# Patient Record
Sex: FEMALE
[De-identification: names, ages, dates, MRNs, and addresses within clinical notes are randomized per-mention and may not be internally consistent; named-entity substitution may affect disease eponyms.]

---

## 2017-09-25 PROBLEM — Z00.00 ENCOUNTER FOR PREVENTIVE HEALTH EXAMINATION: Status: ACTIVE | Noted: 2017-09-25

## 2017-10-02 ENCOUNTER — APPOINTMENT (OUTPATIENT)
Dept: OBGYN | Facility: CLINIC | Age: 22
End: 2017-10-02
Payer: COMMERCIAL

## 2017-10-02 ENCOUNTER — LABORATORY RESULT (OUTPATIENT)
Age: 22
End: 2017-10-02

## 2017-10-02 VITALS
SYSTOLIC BLOOD PRESSURE: 100 MMHG | HEIGHT: 63 IN | WEIGHT: 128 LBS | BODY MASS INDEX: 22.68 KG/M2 | DIASTOLIC BLOOD PRESSURE: 60 MMHG

## 2017-10-02 DIAGNOSIS — Z82.49 FAMILY HISTORY OF ISCHEMIC HEART DISEASE AND OTHER DISEASES OF THE CIRCULATORY SYSTEM: ICD-10-CM

## 2017-10-02 DIAGNOSIS — Z01.419 ENCOUNTER FOR GYNECOLOGICAL EXAMINATION (GENERAL) (ROUTINE) W/OUT ABNORMAL FINDINGS: ICD-10-CM

## 2017-10-02 DIAGNOSIS — Z20.2 CONTACT WITH AND (SUSPECTED) EXPOSURE TO INFECTIONS WITH A PREDOMINANTLY SEXUAL MODE OF TRANSMISSION: ICD-10-CM

## 2017-10-02 DIAGNOSIS — Z78.9 OTHER SPECIFIED HEALTH STATUS: ICD-10-CM

## 2017-10-02 LAB — HCG UR QL: NEGATIVE

## 2017-10-02 PROCEDURE — 81025 URINE PREGNANCY TEST: CPT

## 2017-10-02 PROCEDURE — 99385 PREV VISIT NEW AGE 18-39: CPT

## 2017-10-02 PROCEDURE — 36415 COLL VENOUS BLD VENIPUNCTURE: CPT

## 2017-10-03 PROBLEM — Z78.9 SOCIAL ALCOHOL USE: Status: ACTIVE | Noted: 2017-10-03

## 2017-10-03 PROBLEM — Z82.49 FAMILY HISTORY OF ESSENTIAL HYPERTENSION: Status: ACTIVE | Noted: 2017-10-03

## 2017-10-03 PROBLEM — Z01.419 WELL WOMAN EXAM WITH ROUTINE GYNECOLOGICAL EXAM: Status: ACTIVE | Noted: 2017-10-02

## 2017-10-03 PROBLEM — Z78.9 PATIENT DENIES MEDICAL PROBLEMS: Status: RESOLVED | Noted: 2017-10-03 | Resolved: 2017-10-03

## 2017-10-03 LAB
C TRACH RRNA SPEC QL NAA+PROBE: NORMAL
HBV SURFACE AB SER QL: REACTIVE
HBV SURFACE AG SER QL: NONREACTIVE
HCV AB SER QL: NONREACTIVE
HCV S/CO RATIO: 0.12 S/CO
HIV1+2 AB SPEC QL IA.RAPID: NONREACTIVE
HSV 1+2 IGG SER IA-IMP: NORMAL
HSV 1+2 IGG SER IA-IMP: POSITIVE
HSV1 IGG SER QL: >62.2 INDEX
HSV2 IGG SER QL: 0.08 INDEX
N GONORRHOEA RRNA SPEC QL NAA+PROBE: NORMAL
SOURCE TP AMPLIFICATION: NORMAL

## 2017-10-05 ENCOUNTER — RESULT REVIEW (OUTPATIENT)
Age: 22
End: 2017-10-05

## 2017-10-05 LAB
HBV E AB SER QL: NEGATIVE
HERPES SIMPLEX 1 AND 2 ABS IGM: <0.91 RATIO

## 2017-10-23 ENCOUNTER — APPOINTMENT (OUTPATIENT)
Dept: OBGYN | Facility: CLINIC | Age: 22
End: 2017-10-23
Payer: COMMERCIAL

## 2017-10-23 ENCOUNTER — LABORATORY RESULT (OUTPATIENT)
Age: 22
End: 2017-10-23

## 2017-10-23 LAB
HCG UR QL: NEGATIVE
QUALITY CONTROL: YES

## 2017-10-23 PROCEDURE — 76830 TRANSVAGINAL US NON-OB: CPT

## 2017-10-23 PROCEDURE — 99214 OFFICE O/P EST MOD 30 MIN: CPT | Mod: 25

## 2017-10-23 PROCEDURE — 81025 URINE PREGNANCY TEST: CPT

## 2017-10-24 LAB
C TRACH RRNA SPEC QL NAA+PROBE: NOT DETECTED
N GONORRHOEA RRNA SPEC QL NAA+PROBE: NOT DETECTED
SOURCE TP AMPLIFICATION: NORMAL

## 2017-10-26 ENCOUNTER — RESULT REVIEW (OUTPATIENT)
Age: 22
End: 2017-10-26

## 2017-11-27 ENCOUNTER — APPOINTMENT (OUTPATIENT)
Dept: OBGYN | Facility: CLINIC | Age: 22
End: 2017-11-27
Payer: COMMERCIAL

## 2017-11-27 VITALS
HEIGHT: 63 IN | WEIGHT: 128 LBS | DIASTOLIC BLOOD PRESSURE: 64 MMHG | BODY MASS INDEX: 22.68 KG/M2 | RESPIRATION RATE: 16 BRPM | SYSTOLIC BLOOD PRESSURE: 102 MMHG

## 2017-11-27 DIAGNOSIS — Z30.430 ENCOUNTER FOR INSERTION OF INTRAUTERINE CONTRACEPTIVE DEVICE: ICD-10-CM

## 2017-11-27 DIAGNOSIS — Z97.5 PRESENCE OF (INTRAUTERINE) CONTRACEPTIVE DEVICE: ICD-10-CM

## 2017-11-27 PROCEDURE — 81025 URINE PREGNANCY TEST: CPT

## 2017-11-27 PROCEDURE — 99213 OFFICE O/P EST LOW 20 MIN: CPT | Mod: 25

## 2017-11-27 PROCEDURE — 76830 TRANSVAGINAL US NON-OB: CPT

## 2017-12-19 ENCOUNTER — APPOINTMENT (OUTPATIENT)
Dept: OBGYN | Facility: CLINIC | Age: 22
End: 2017-12-19
Payer: COMMERCIAL

## 2017-12-19 VITALS
BODY MASS INDEX: 23.04 KG/M2 | RESPIRATION RATE: 16 BRPM | TEMPERATURE: 98.3 F | WEIGHT: 130 LBS | HEIGHT: 63 IN | DIASTOLIC BLOOD PRESSURE: 78 MMHG | SYSTOLIC BLOOD PRESSURE: 120 MMHG

## 2017-12-19 DIAGNOSIS — Z30.49 ENCOUNTER FOR SURVEILLANCE OF OTHER CONTRACEPTIVES: ICD-10-CM

## 2017-12-19 LAB
BILIRUB UR QL STRIP: NORMAL
CLARITY UR: CLEAR
COLLECTION METHOD: NORMAL
GLUCOSE UR-MCNC: NORMAL
HCG UR QL: 0.2 EU/DL
HCG UR QL: NEGATIVE
HGB UR QL STRIP.AUTO: NORMAL
KETONES UR-MCNC: NORMAL
LEUKOCYTE ESTERASE UR QL STRIP: NORMAL
NITRITE UR QL STRIP: NORMAL
PH UR STRIP: 7.5
PROT UR STRIP-MCNC: NORMAL
QUALITY CONTROL: YES
SP GR UR STRIP: 1.01

## 2017-12-19 PROCEDURE — 99214 OFFICE O/P EST MOD 30 MIN: CPT | Mod: 25

## 2017-12-19 PROCEDURE — 76830 TRANSVAGINAL US NON-OB: CPT

## 2018-07-30 ENCOUNTER — APPOINTMENT (OUTPATIENT)
Dept: OBGYN | Facility: CLINIC | Age: 23
End: 2018-07-30
Payer: COMMERCIAL

## 2018-07-30 PROCEDURE — 99213 OFFICE O/P EST LOW 20 MIN: CPT

## 2018-08-01 ENCOUNTER — RX RENEWAL (OUTPATIENT)
Age: 23
End: 2018-08-01

## 2018-08-01 LAB
CANDIDA VAG CYTO: NOT DETECTED
G VAGINALIS+PREV SP MTYP VAG QL MICRO: DETECTED
T VAGINALIS VAG QL WET PREP: NOT DETECTED

## 2018-08-17 ENCOUNTER — RX RENEWAL (OUTPATIENT)
Age: 23
End: 2018-08-17

## 2018-10-26 ENCOUNTER — APPOINTMENT (OUTPATIENT)
Dept: OBGYN | Facility: CLINIC | Age: 23
End: 2018-10-26
Payer: COMMERCIAL

## 2018-10-26 VITALS
DIASTOLIC BLOOD PRESSURE: 68 MMHG | BODY MASS INDEX: 23.04 KG/M2 | HEIGHT: 63 IN | SYSTOLIC BLOOD PRESSURE: 106 MMHG | RESPIRATION RATE: 16 BRPM | TEMPERATURE: 98.4 F | WEIGHT: 130 LBS

## 2018-10-26 DIAGNOSIS — N64.9 DISORDER OF BREAST, UNSPECIFIED: ICD-10-CM

## 2018-10-26 DIAGNOSIS — B96.89 ACUTE VAGINITIS: ICD-10-CM

## 2018-10-26 DIAGNOSIS — N76.0 ACUTE VAGINITIS: ICD-10-CM

## 2018-10-26 DIAGNOSIS — Z87.42 PERSONAL HISTORY OF OTHER DISEASES OF THE FEMALE GENITAL TRACT: ICD-10-CM

## 2018-10-26 PROCEDURE — 76830 TRANSVAGINAL US NON-OB: CPT

## 2018-10-26 PROCEDURE — 99395 PREV VISIT EST AGE 18-39: CPT

## 2018-10-31 LAB — CYTOLOGY CVX/VAG DOC THIN PREP: NORMAL

## 2019-05-10 ENCOUNTER — APPOINTMENT (OUTPATIENT)
Dept: OBGYN | Facility: CLINIC | Age: 24
End: 2019-05-10
Payer: COMMERCIAL

## 2019-05-10 VITALS
TEMPERATURE: 98.5 F | WEIGHT: 124.12 LBS | BODY MASS INDEX: 21.99 KG/M2 | HEIGHT: 63 IN | SYSTOLIC BLOOD PRESSURE: 100 MMHG | DIASTOLIC BLOOD PRESSURE: 70 MMHG

## 2019-05-10 DIAGNOSIS — N83.209 UNSPECIFIED OVARIAN CYST, UNSPECIFIED SIDE: ICD-10-CM

## 2019-05-10 PROCEDURE — 99214 OFFICE O/P EST MOD 30 MIN: CPT | Mod: 25

## 2019-05-10 PROCEDURE — 76830 TRANSVAGINAL US NON-OB: CPT

## 2019-05-19 LAB
CANDIDA VAG CYTO: NOT DETECTED
G VAGINALIS+PREV SP MTYP VAG QL MICRO: NOT DETECTED
T VAGINALIS VAG QL WET PREP: NOT DETECTED

## 2019-05-19 NOTE — PHYSICAL EXAM
[Soft] : soft [Discharge] : a  ~M vaginal discharge was present [Moderate] : moderate [Michelle] : yellow [Scant] : there was scant vaginal bleeding [Thin] : thin [IUD String] : had an IUD string protruding out [Distended] : not distended [Tender] : non tender [Motion Tenderness] : there was no cervical motion tenderness

## 2019-05-20 ENCOUNTER — RESULT REVIEW (OUTPATIENT)
Age: 24
End: 2019-05-20

## 2019-09-30 ENCOUNTER — APPOINTMENT (OUTPATIENT)
Dept: OBGYN | Facility: CLINIC | Age: 24
End: 2019-09-30
Payer: COMMERCIAL

## 2019-09-30 VITALS
BODY MASS INDEX: 21.88 KG/M2 | HEIGHT: 63 IN | TEMPERATURE: 98.6 F | SYSTOLIC BLOOD PRESSURE: 100 MMHG | DIASTOLIC BLOOD PRESSURE: 70 MMHG | WEIGHT: 123.46 LBS

## 2019-09-30 DIAGNOSIS — Z97.5 PROCEDURE AND TREATMENT NOT CARRIED OUT FOR OTHER REASONS: ICD-10-CM

## 2019-09-30 DIAGNOSIS — Z53.8 PROCEDURE AND TREATMENT NOT CARRIED OUT FOR OTHER REASONS: ICD-10-CM

## 2019-09-30 DIAGNOSIS — Z30.9 ENCOUNTER FOR CONTRACEPTIVE MANAGEMENT, UNSPECIFIED: ICD-10-CM

## 2019-09-30 PROCEDURE — 99214 OFFICE O/P EST MOD 30 MIN: CPT

## 2019-09-30 NOTE — PHYSICAL EXAM
[Normal] : vagina [Labia Majora] : labia major [IUD String] : did not have an IUD string protruding out

## 2019-09-30 NOTE — HISTORY OF PRESENT ILLNESS
eMERGENCY dEPARTMENT eNCOUnter      CHIEF COMPLAINT    Chief Complaint   Patient presents with   • Back Pain   • Abdominal Pain       HPI    Bernadine Sahu is a 52 year old female who presents to the emergency department here for epigastric pain and mid abdominal pain upper, started 4:30 this afternoon, mild to moderate sharp burning pain, also middle of her back pain. History of scoliosis. Has some loose stools. No nausea vomiting fever or chills. No shortness breath or pleurisy. No neurologic focal deficits.    ALLERGIES    ALLERGIES:   Allergen Reactions   • Ciprofloxacin SHORTNESS OF BREATH   • Macrobid [Nitrofurantoin] SHORTNESS OF BREATH     Possible pulmonary toxicity.   • Metronidazole Nausea & Vomiting     Nausea and emesis   • Seasonal ANAPHYLAXIS   • Codeine RASH   • Hydrocodone HIVES and RASH       CURRENT MEDICATIONS    Current Facility-Administered Medications   Medication Dose Route Frequency Provider Last Rate Last Dose   • aluminum-magnesium hydroxide-simethicone (MAALOX) 200-200-20 MG/5ML suspension 30 mL  30 mL Oral PRN Karl T Dinero, DO   30 mL at 02/19/18 2315    And   • lidocaine viscous (XYLOCAINE) 2 % oral solution 15 mL  15 mL Oral PRN Karl T Dinero, DO   15 mL at 02/19/18 2315     Current Outpatient Prescriptions   Medication Sig Dispense Refill   • meloxicam (MOBIC) 15 MG tablet Take 1 tablet by mouth daily. 30 tablet 5   • hydroCORTisone (CORTIZONE) 2.5 % cream Apply twice daily for 2 weeks (need appointment if not improving) 28 g 0   • tiotropium (SPIRIVA RESPIMAT) 2.5 MCG/ACT inhaler Inhale 2 puffs into the lungs daily. 4 g 5   • albuterol 108 (90 Base) MCG/ACT inhaler Inhale 2 puffs into the lungs every 4 hours as needed for Shortness of Breath or Wheezing. 8.5 g 0   • diclofenac (VOLTAREN) 1 % gel Apply 2-4 grams to the knees up to 4 times daily as needed 300 g 5   • acetaminophen (TYLENOL) 650 MG CR tablet Take 1 tablet by mouth every 6 hours as needed for Pain. 120 tablet 3   •  loratadine (CLARITIN) 10 MG tablet Take 1 tablet by mouth daily. (Patient taking differently: Take 10 mg by mouth daily. During allergy seasons.) 30 tablet 2   • ibuprofen (MOTRIN) 600 MG tablet Take 1 tablet by mouth every 8 hours as needed for Pain. 90 tablet 3   • tiZANidine (ZANAFLEX) 2 MG tablet 1-2 tabs po TID PRN for muscle spasms 90 tablet 1   • calcium (OYST-DOMINIC) 500 MG tablet Take 1 tablet by mouth daily. 30 tablet 0   • cholecalciferol (VITAMIN D3) 1000 UNITS tablet Take 1,000 Units by mouth daily.         PAST MEDICAL HISTORY    Past Medical History:   Diagnosis Date   • Adenomatous colon polyp 01/2017   • Allergy    • Back pain    • Centrilobular emphysema (CMS/HCC) 8/11/2017   • Chronic pain     back   • DJD (degenerative joint disease), thoracolumbar     xray 1/2009   • Herpes simplex    • RAD (reactive airway disease)    • Scoliosis    • Sinusitis, chronic    • Tobacco use disorder        SURGICAL HISTORY    Past Surgical History:   Procedure Laterality Date   • Back surgery  1976    scoliosis, fusion thoracolumbar, rods wires   • Colonoscopy diagnostic  01/19/2017    Affi 3yr recall, 4 polyps tubular adenomas   • Hysterectomy  11/30/2007    uterine fibroid   • Spine surgery         SOCIAL HISTORY    Social History     Social History   • Marital status:      Spouse name: N/A   • Number of children: N/A   • Years of education: N/A     Occupational History   • unemployed      Social History Main Topics   • Smoking status: Former Smoker     Packs/day: 0.30     Years: 28.00     Types: Cigarettes     Quit date: 1/1/2017   • Smokeless tobacco: Never Used      Comment: 3 to 4 cigarettes per day, quit since Jan   • Alcohol use 0.0 oz/week      Comment: rarely   • Drug use: No   • Sexual activity: Not Currently     Birth control/ protection: Surgical     Other Topics Concern   • Caffeine Concern No   • Sleep Concern No     difficult to get comfortable   • Special Diet No   • Exercise No   • Seat Belt  Yes     Social History Narrative    Lives by self.  Feels safe in home       FAMILY HISTORY    Family History   Problem Relation Age of Onset   • Hypertension Mother    • High blood pressure Mother    • Early death Brother 46     killed   • Aneurysm Brother 47   • Hypertension Brother    • Thyroid Other      cousin   • Systemic Lupus Erythematosus Other      cousin   • Arthritis Daughter    • High blood pressure Daughter    • Arthritis Son        REVIEW OF SYSTEMS      Constitutional:  Denies fever or chills.   Eyes:  Denies change in visual acuity, no diplopia  HENT:  Denies nasal congestion or sore throat.   Respiratory:  Denies cough or shortness of breath.   Cardiovascular:  Denies chest pain or palpitations.   GI:   abdominal pain. No nausea or vomiting. Loose stools.  :  Denies dysuria. No hematuria.  Musculoskeletal:  Denies back pain or joint pain.   Skin:  Denies rash. No petechia.  Neurologic:  Denies headache. No weakness. No paresthesias.   Endocrine:  Denies polyuria or polydipsia.   Lymphatic:  Denies swollen glands or edema  Psychiatric:  Denies depression or anxiety.    PHYSICAL EXAM    Vitals:    02/19/18 2038 02/20/18 0019   BP: 132/73 121/79   Pulse: 75 72   Resp: 16 16   Temp: 98.3 °F (36.8 °C)    TempSrc: Oral    SpO2: 98% 99%   Weight: 81.6 kg    Height: 5' 9\" (1.753 m)        Pulse Ox / Interpretation:  98%    Constitutional:  Well developed, well nourished. No acute distress, non-toxic appearance.   Eyes:  PERRL, EOMI.  Conjunctivae normal.   HENT:  Atraumatic/normocephalic. External ears normal. Nose normal. Oropharynx moist. No drooling, no stridor  Neck:  Supple. Normal range of motion. No tenderness. No meningeal signs  Respiratory:  No respiratory distress, normal breath sounds. No rales. No wheezing. No retractions  Cardiovascular:  Normal rate, normal rhythm. No murmurs, gallops, or rubs.  GI:  Soft, nondistended, nonrigid, no guarding. Mild epigastric region tenderness.  Normal  [Sexually Active] : is sexually active bowel sounds. No hepatomegaly or splenomegaly.  No rebound or guarding. Negative Lopez's sign. Negative McBurney's point.   :  No costovertebral angle tenderness.   Musculoskeletal:  No tenderness. No edema in all 4 extremities. No deformities. Back - no muscular or point vertebral tenderness. No homans.  Skin:  Warm and dry. Well hydrated. No rashes.  No petechiae or purpura.   Lymphatic:  No anterior or posterior cervical lymphadenopathy noted. No submandibular lymphadenopathy.  Neurologic:  Alert & oriented x 3.  Normal motor function. Normal sensory function. No focal deficits noted. GCS 15.  Psychiatric:  Speech and behavior appropriate.   Pulses:  DP/PT 2+, Radial 2+      EKG    Preliminary EKG interpretation: per my review:  Rate: 55  Rhythm: normal sinus rhythm normal axis no STEMI no acute findings  Abnormality: no  Final interpretation pending formal review by cardiology      RADIOLOGY        I have reviewed radiology images and impressions    LABS    Results for orders placed or performed during the hospital encounter of 02/19/18   Urinalysis with Micro & Culture if Indicated   Result Value    COLOR YELLOW    APPEARANCE CLEAR    GLUCOSE(URINE) NEGATIVE    BILIRUBIN NEGATIVE    KETONES NEGATIVE    SPECIFIC GRAVITY 1.015    BLOOD NEGATIVE    pH 7.5 (H)    PROTEIN(URINE) NEGATIVE    UROBILINOGEN 0.2    NITRITE NEGATIVE    LEUKOCYTE ESTERASE NEGATIVE    Squamous EPI'S 11 to 25    RBC NONE SEEN    WBC 1 to 5    BACTERIA NONE SEEN    Hyaline Casts NONE SEEN    SPECIMEN TYPE URINE, CLEAN CATCH/MIDSTREAM   Comprehensive Metabolic Panel   Result Value    Sodium 142    Potassium 3.9    Chloride 107    Carbon Dioxide 27    Anion Gap 12    Glucose 103 (H)    BUN 18    Creatinine 0.91    GFR Estimate,  83     Comment: eGFR 60 - 89 mL/min/1.73m2 = Mild decrease in kidney function.    GFR Estimate, Non  72     Comment: eGFR 60 - 89 mL/min/1.73m2 = Mild decrease in kidney function.     BUN/Creatinine Ratio 20    CALCIUM 8.8    TOTAL BILIRUBIN 0.3    AST/SGOT 19    ALT/SGPT 24    ALK PHOSPHATASE 91    TOTAL PROTEIN 7.0    Albumin 3.8    GLOBULIN 3.2    A/G Ratio, Serum 1.2   CBC & Auto Differential   Result Value    WBC 7.0    RBC 4.11    HGB 11.7 (L)    HCT 35.3 (L)    MCV 85.9    MCH 28.5    MCHC 33.1    RDW-CV 12.7        DIFF TYPE AUTOMATED DIFFERENTIAL    Neutrophil 76    LYMPH 18    MONO 4    EOSIN 2    BASO 0    Absolute Neutrophil 5.3    Absolute Lymph 1.3    Absolute Mono 0.3    Absolute Eos 0.2    Absolute Baso 0.0   Lipase Level   Result Value    Lipase 106   D Dimer Quantitative   Result Value    D Dimer Quantitative 1.00 (H)     Comment: Values <0.50 mg/L (FEU) may be useful to help exclude DVT or PE in patients at low clinical risk for these disorders.   Troponin I - Point of Care   Result Value    Troponin I POC <0.10       I have reviewed lab results    RECHECKS   Medications   aluminum-magnesium hydroxide-simethicone (MAALOX) 200-200-20 MG/5ML suspension 30 mL (30 mLs Oral Given 2/19/18 2315)     And   lidocaine viscous (XYLOCAINE) 2 % oral solution 15 mL (15 mLs Oral Given 2/19/18 2315)   famotidine (PEPCID) injection 20 mg (20 mg Intravenous Given 2/19/18 2315)   iopamidol (ISOVUE-370) 76 % injection 75 mL (75 mLs Intravenous Given 2/19/18 2330)             ED COURSE & MEDICAL DECISION MAKING    52 year old female presents to the emergency department with complaints of upper abdominal pain region, nonradiating, some back pain associated. Awaiting for labs and ultrasound of the upper quadrant. I will trial Pepcid and GI cocktail. EKG troponin negative. Endorsed to Dr. Lo, for reevaluation review of pending labs and diagnostics and final disposition.    Patient signed out from Dr Dinero. Labs, US and x-ray pending. D dimer of 1.0 noted. CT PE protocol ordered in response.     Labs, ultrasound, x-ray and CT all without evidence of acute process. Patient was made aware of the  [Monogamous] : is monogamous [Male ___] : [unfilled] male nodule on CT as well as the emphysematous changes. Patient states she will follow-up with her primary care provider tomorrow. His custom recommendations for return including worsening or change in symptoms with the patient and her significant other voiced her understanding.    Discussed with patient/and family results, treatments, and plan.  All questions were answered.     IMPRESSION:      ICD-10-CM    1. Epigastric pain R10.13        Follow Up:  Leana Ovalles NP  [address]   [facility] [address]  [phone]          [facility]  [address] [facility] [address]  [phone]    If symptoms worsen       New Prescriptions    No medications on file                    Karl Dinero DO  02/19/18 2301       Lex Lo MD  02/19/18 7493       Lex Lo MD  02/20/18 0039

## 2019-11-01 ENCOUNTER — APPOINTMENT (OUTPATIENT)
Dept: OBGYN | Facility: CLINIC | Age: 24
End: 2019-11-01

## 2020-01-21 ENCOUNTER — APPOINTMENT (OUTPATIENT)
Dept: OBGYN | Facility: CLINIC | Age: 25
End: 2020-01-21
Payer: COMMERCIAL

## 2020-01-21 VITALS
RESPIRATION RATE: 18 BRPM | OXYGEN SATURATION: 98 % | HEIGHT: 63 IN | BODY MASS INDEX: 22.68 KG/M2 | SYSTOLIC BLOOD PRESSURE: 122 MMHG | WEIGHT: 128 LBS | HEART RATE: 71 BPM | DIASTOLIC BLOOD PRESSURE: 70 MMHG

## 2020-01-21 DIAGNOSIS — Z01.419 ENCOUNTER FOR GYNECOLOGICAL EXAMINATION (GENERAL) (ROUTINE) W/OUT ABNORMAL FINDINGS: ICD-10-CM

## 2020-01-21 PROCEDURE — 99395 PREV VISIT EST AGE 18-39: CPT

## 2020-01-22 NOTE — PHYSICAL EXAM
[Alert] : alert [Awake] : awake [Mass] : no breast mass [Acute Distress] : no acute distress [Axillary LAD] : no axillary lymphadenopathy [Nipple Discharge] : no nipple discharge [Soft] : soft [Tender] : non tender [Oriented x3] : oriented to person, place, and time [Normal] : uterus [No Bleeding] : there was no active vaginal bleeding [Pap Obtained] : a Pap smear was not performed [IUD String] : had an IUD string protruding out [Uterine Adnexae] : were not tender and not enlarged

## 2020-01-22 NOTE — PROCEDURE
[Locate IUD] : locate IUD [Present] : uterus present [Transvaginal Ultrasound] : transvaginal ultrasound [Anteverted] : anteverted [FreeTextEntry4] : L ovary w a 2.7cm hemorrhagic cyst; R ovary normal; no FF

## 2020-01-27 ENCOUNTER — RESULT REVIEW (OUTPATIENT)
Age: 25
End: 2020-01-27

## 2020-01-27 ENCOUNTER — LABORATORY RESULT (OUTPATIENT)
Age: 25
End: 2020-01-27

## 2020-02-03 ENCOUNTER — APPOINTMENT (OUTPATIENT)
Dept: OBGYN | Facility: CLINIC | Age: 25
End: 2020-02-03
Payer: COMMERCIAL

## 2020-02-03 DIAGNOSIS — R87.619 UNSPECIFIED ABNORMAL CYTOLOGICAL FINDINGS IN SPECIMENS FROM CERVIX UTERI: ICD-10-CM

## 2020-02-03 PROCEDURE — 57454 BX/CURETT OF CERVIX W/SCOPE: CPT

## 2020-02-03 PROCEDURE — 81025 URINE PREGNANCY TEST: CPT

## 2020-02-05 LAB
HCG UR QL: NEGATIVE
QUALITY CONTROL: YES

## 2020-02-16 PROBLEM — R87.619 ABNORMAL PAPANICOLAOU SMEAR OF CERVIX: Status: ACTIVE | Noted: 2020-02-16

## 2020-02-18 ENCOUNTER — APPOINTMENT (OUTPATIENT)
Dept: OBGYN | Facility: CLINIC | Age: 25
End: 2020-02-18

## 2020-05-07 ENCOUNTER — APPOINTMENT (OUTPATIENT)
Dept: OBGYN | Facility: CLINIC | Age: 25
End: 2020-05-07
Payer: COMMERCIAL

## 2020-05-07 DIAGNOSIS — N89.8 OTHER SPECIFIED NONINFLAMMATORY DISORDERS OF VAGINA: ICD-10-CM

## 2020-05-07 PROCEDURE — 99214 OFFICE O/P EST MOD 30 MIN: CPT | Mod: 25

## 2020-05-07 PROCEDURE — 76830 TRANSVAGINAL US NON-OB: CPT

## 2020-05-07 NOTE — PROCEDURE
[Locate IUD] : locate IUD [Present] : uterus present [FreeTextEntry3] : IUD correctly placed within endometrial cavity [FreeTextEntry7] : 3.4 x 3.9cm [FreeTextEntry8] : 3.5cm with a 2.6cm hemorrhagic cyst [FreeTextEntry4] : Small left hemorrhagic cyst

## 2020-05-07 NOTE — PHYSICAL EXAM
[Normal] : uterus [Moderate] : there was moderate vaginal bleeding [IUD String] : had an IUD string protruding out [Motion Tenderness] : there was no cervical motion tenderness [Tenderness] : nontender [Adnexa Tenderness] : were not tender

## 2020-05-08 LAB
C TRACH RRNA SPEC QL NAA+PROBE: NOT DETECTED
CANDIDA VAG CYTO: NOT DETECTED
G VAGINALIS+PREV SP MTYP VAG QL MICRO: DETECTED
N GONORRHOEA RRNA SPEC QL NAA+PROBE: NOT DETECTED
SOURCE AMPLIFICATION: NORMAL
T VAGINALIS VAG QL WET PREP: NOT DETECTED

## 2020-12-23 PROBLEM — Z01.419 ENCOUNTER FOR ANNUAL ROUTINE GYNECOLOGICAL EXAMINATION: Status: RESOLVED | Noted: 2018-10-26 | Resolved: 2020-12-23

## 2021-02-15 ENCOUNTER — EMERGENCY (EMERGENCY)
Facility: HOSPITAL | Age: 26
LOS: 1 days | Discharge: HOME | End: 2021-02-15
Payer: COMMERCIAL

## 2021-02-15 VITALS
TEMPERATURE: 98 F | DIASTOLIC BLOOD PRESSURE: 94 MMHG | SYSTOLIC BLOOD PRESSURE: 137 MMHG | HEART RATE: 77 BPM | OXYGEN SATURATION: 99 % | RESPIRATION RATE: 18 BRPM

## 2021-02-15 LAB
FLU A RESULT: SIGNIFICANT CHANGE UP
FLU A RESULT: SIGNIFICANT CHANGE UP
FLUAV AG NPH QL: SIGNIFICANT CHANGE UP
FLUBV AG NPH QL: SIGNIFICANT CHANGE UP
RSV RESULT: SIGNIFICANT CHANGE UP
RSV RNA RESP QL NAA+PROBE: SIGNIFICANT CHANGE UP
SARS-COV-2 RNA SPEC QL NAA+PROBE: DETECTED

## 2021-02-15 PROCEDURE — 99284 EMERGENCY DEPT VISIT MOD MDM: CPT

## 2021-02-15 NOTE — ED PROVIDER NOTE - NS ED ROS FT
+ HA/ nasal congestion  Denies fever, chills, fatigue, and weight loss. Denies, Dizziness. ENMT: Denies  difficulty swallowing, sore throat, loss of taste or smell. CARDIO: Denies CP, palpitations, edema. RESP: Denies Cough, SOB, Diff breathing, hemoptysis. GI: Denies N/V, ABD pain, change in bowel movement.. MS: Denies joint pain, back pain, weakness, decreased ROM, swelling. NEURO: Denies change in gait, seizures, loss of sensation, dizziness, confusion LOC. SKIN: denies rash or discoloration

## 2021-02-15 NOTE — ED PROVIDER NOTE - PATIENT PORTAL LINK FT
You can access the FollowMyHealth Patient Portal offered by [facility] by registering at the following website: [email or web address]. By joining [employer]’s FollowMyHealth portal, you will also be able to view your health information using other applications (apps) compatible with our system.

## 2021-02-15 NOTE — ED PROVIDER NOTE - OBJECTIVE STATEMENT
COVID SYMPTOMATIC SWAB        Pt is presenting to the ER for.... Pt reports…. Denies fevers chills, loss of taste or smell, denies URI symptoms, denies chest pain or shortness of breath, denies nausea vomiting diarrhea or abdominal pain, and denies weakness or fatigue. Pt requesting testing at this time. [x] known exposure [] no-known exposure

## 2021-02-15 NOTE — ED PROVIDER NOTE - CLINICAL SUMMARY MEDICAL DECISION MAKING FREE TEXT BOX
Pt nontoxic appearing, stable vitals, ambulatory with stable saturation without supplemental oxygen. PT does not meet criteria listed in most updated guidelines as per [facility] protocol/algorithm for admission at this time. pt advised about self-quarantine instructions until negative test results and/or symptom resolution. pt advised on hand hygiene, monitoring of symptoms, antipyretic use as well as and fu with primary care provider. Instructions given in pre-printed copy.

## 2021-02-15 NOTE — ED ADULT TRIAGE NOTE - CHIEF COMPLAINT QUOTE
pt. arrives to ED for covid swab.  Positive exposure.  Pt. complaining of runny nose, intermittent cough and frontal headache

## 2021-02-25 ENCOUNTER — APPOINTMENT (OUTPATIENT)
Dept: OBGYN | Facility: CLINIC | Age: 26
End: 2021-02-25

## 2021-02-27 ENCOUNTER — EMERGENCY (EMERGENCY)
Facility: HOSPITAL | Age: 26
LOS: 1 days | Discharge: LEFT BEFORE DISCHARGE | End: 2021-02-27
Payer: SELF-PAY

## 2021-02-27 PROCEDURE — L9992: CPT

## 2021-03-10 ENCOUNTER — APPOINTMENT (OUTPATIENT)
Dept: OBGYN | Facility: CLINIC | Age: 26
End: 2021-03-10
Payer: COMMERCIAL

## 2021-03-10 VITALS
HEIGHT: 63 IN | DIASTOLIC BLOOD PRESSURE: 70 MMHG | BODY MASS INDEX: 23.39 KG/M2 | WEIGHT: 132 LBS | SYSTOLIC BLOOD PRESSURE: 120 MMHG

## 2021-03-10 DIAGNOSIS — Z01.411 ENCOUNTER FOR GYNECOLOGICAL EXAMINATION (GENERAL) (ROUTINE) WITH ABNORMAL FINDINGS: ICD-10-CM

## 2021-03-10 DIAGNOSIS — Z78.9 OTHER SPECIFIED HEALTH STATUS: ICD-10-CM

## 2021-03-10 DIAGNOSIS — Z82.49 FAMILY HISTORY OF ISCHEMIC HEART DISEASE AND OTHER DISEASES OF THE CIRCULATORY SYSTEM: ICD-10-CM

## 2021-03-10 DIAGNOSIS — Z12.4 ENCOUNTER FOR SCREENING FOR MALIGNANT NEOPLASM OF CERVIX: ICD-10-CM

## 2021-03-10 DIAGNOSIS — Z80.0 FAMILY HISTORY OF MALIGNANT NEOPLASM OF DIGESTIVE ORGANS: ICD-10-CM

## 2021-03-10 DIAGNOSIS — T83.32XA DISPLACEMENT OF INTRAUTERINE CONTRACEPTIVE DEVICE, INITIAL ENCOUNTER: ICD-10-CM

## 2021-03-10 DIAGNOSIS — Z11.3 ENCOUNTER FOR SCREENING FOR INFECTIONS WITH A PREDOMINANTLY SEXUAL MODE OF TRANSMISSION: ICD-10-CM

## 2021-03-10 DIAGNOSIS — R87.612 LOW GRADE SQUAMOUS INTRAEPITHELIAL LESION ON CYTOLOGIC SMEAR OF CERVIX (LGSIL): ICD-10-CM

## 2021-03-10 PROCEDURE — 90651 9VHPV VACCINE 2/3 DOSE IM: CPT

## 2021-03-10 PROCEDURE — 99395 PREV VISIT EST AGE 18-39: CPT | Mod: 25

## 2021-03-10 PROCEDURE — 90471 IMMUNIZATION ADMIN: CPT

## 2021-03-10 PROCEDURE — 99072 ADDL SUPL MATRL&STAF TM PHE: CPT

## 2021-03-10 RX ORDER — METRONIDAZOLE 500 MG/1
500 TABLET ORAL TWICE DAILY
Qty: 14 | Refills: 0 | Status: DISCONTINUED | COMMUNITY
Start: 2018-08-01 | End: 2021-03-10

## 2021-03-10 RX ORDER — NORGESTIMATE AND ETHINYL ESTRADIOL 7DAYSX3 28
0.18/0.215/0.25 KIT ORAL DAILY
Qty: 28 | Refills: 2 | Status: DISCONTINUED | COMMUNITY
Start: 2019-09-30 | End: 2021-03-10

## 2021-03-10 NOTE — DISCUSSION/SUMMARY
[FreeTextEntry1] : 1) Last pap results reviewed\par 2) Pathology of HPV reviewed along with benefits of vaccine. Gardasil 9 administered\par 3) IUD string not seen - rx for pelvic imaging issued\par \par Return to office in 2 months for next Gardasil\par \par will f/u pending receipt of pelvic imaging resutls\par \par 30 minutes were spent face to face in this visit with greater than 50% of the time spent counseling\par \par

## 2021-03-10 NOTE — PHYSICAL EXAM
[Appropriately responsive] : appropriately responsive [Alert] : alert [No Acute Distress] : no acute distress [No Lymphadenopathy] : no lymphadenopathy [Oriented x3] : oriented x3 [] : implants [No Discharge] : no discharge [No Masses] : no breast masses were palpable [Labia Majora] : normal [Labia Minora] : normal [Normal] : normal [Uterine Adnexae] : normal [FreeTextEntry5] : no IUD string noted

## 2021-03-10 NOTE — HISTORY OF PRESENT ILLNESS
[Currently Active] : currently active [Men] : men [No] : No [TextBox_4] : Nunu is a 24 y/o G0 who presents today for an annual exam. 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

## 2021-03-10 NOTE — COUNSELING
[Contraception/ Emergency Contraception/ Safe Sexual Practices] : contraception, emergency contraception, safe sexual practices [HPV Vaccine] : HPV Vaccine

## 2021-03-12 LAB
C TRACH RRNA SPEC QL NAA+PROBE: NOT DETECTED
N GONORRHOEA RRNA SPEC QL NAA+PROBE: NOT DETECTED
SOURCE AMPLIFICATION: NORMAL
SOURCE TP AMPLIFICATION: NORMAL
T VAGINALIS RRNA SPEC QL NAA+PROBE: NOT DETECTED

## 2021-03-15 ENCOUNTER — LABORATORY RESULT (OUTPATIENT)
Age: 26
End: 2021-03-15

## 2021-03-30 ENCOUNTER — APPOINTMENT (OUTPATIENT)
Dept: OBGYN | Facility: CLINIC | Age: 26
End: 2021-03-30
Payer: COMMERCIAL

## 2021-03-30 ENCOUNTER — LABORATORY RESULT (OUTPATIENT)
Age: 26
End: 2021-03-30

## 2021-03-30 LAB
HCG UR QL: NEGATIVE
QUALITY CONTROL: YES

## 2021-03-30 PROCEDURE — 99072 ADDL SUPL MATRL&STAF TM PHE: CPT

## 2021-03-30 PROCEDURE — 57454 BX/CURETT OF CERVIX W/SCOPE: CPT

## 2021-03-30 PROCEDURE — 81025 URINE PREGNANCY TEST: CPT

## 2021-03-30 NOTE — PROCEDURE
[Colposcopy] : Colposcopy  [Time out performed] : Pre-procedure time out performed.  Patient's name, date of birth and procedure confirmed. [Consent Obtained] : Consent obtained [Risks] : risks [Benefits] : benefits [Alternatives] : alternatives [Patient] : patient [Infection] : infection [Bleeding] : bleeding [Allergic Reaction] : allergic reaction [ASCUS] : ASCUS [HPV High Risk] : HPV high risk [No Premedication] : no premedication [Colposcopy Adequate] : colposcopy adequate [Pap Performed] : pap not performed [SCI Fully Visualized] : SCI fully visualized [ECC Performed] : ECC performed [No Abnormalities] : no abnormalities [Biopsy] : biopsy taken [Hemostasis Obtained] : Hemostasis obtained [Tolerated Well] : the patient tolerated the procedure well [de-identified] : positive for HPV 18/45 [de-identified] : 2 [de-identified] : aacetowhite epithelial changes [de-identified] : 2:00 cervix  4:00 cervix [de-identified] : mild acetowhite epithelial changes further treatment pending pathology results

## 2021-04-08 ENCOUNTER — NON-APPOINTMENT (OUTPATIENT)
Age: 26
End: 2021-04-08

## 2021-08-18 ENCOUNTER — NON-APPOINTMENT (OUTPATIENT)
Age: 26
End: 2021-08-18

## 2021-09-21 ENCOUNTER — NON-APPOINTMENT (OUTPATIENT)
Age: 26
End: 2021-09-21

## 2021-10-12 ENCOUNTER — APPOINTMENT (OUTPATIENT)
Dept: OBGYN | Facility: CLINIC | Age: 26
End: 2021-10-12
Payer: COMMERCIAL

## 2021-10-12 VITALS
TEMPERATURE: 97.7 F | HEIGHT: 63 IN | WEIGHT: 134 LBS | DIASTOLIC BLOOD PRESSURE: 60 MMHG | SYSTOLIC BLOOD PRESSURE: 110 MMHG | HEART RATE: 75 BPM | RESPIRATION RATE: 14 BRPM | BODY MASS INDEX: 23.74 KG/M2

## 2021-10-12 DIAGNOSIS — R87.810 ATYPICAL SQUAMOUS CELLS OF UNDETERMINED SIGNIFICANCE ON CYTOLOGIC SMEAR OF CERVIX (ASC-US): ICD-10-CM

## 2021-10-12 DIAGNOSIS — Z23 ENCOUNTER FOR IMMUNIZATION: ICD-10-CM

## 2021-10-12 DIAGNOSIS — R10.2 PELVIC AND PERINEAL PAIN: ICD-10-CM

## 2021-10-12 DIAGNOSIS — R87.610 ATYPICAL SQUAMOUS CELLS OF UNDETERMINED SIGNIFICANCE ON CYTOLOGIC SMEAR OF CERVIX (ASC-US): ICD-10-CM

## 2021-10-12 LAB
BILIRUB UR QL STRIP: NORMAL
CLARITY UR: CLEAR
COLLECTION METHOD: NORMAL
GLUCOSE UR-MCNC: NORMAL
HCG UR QL: 0.2 EU/DL
HCG UR QL: NEGATIVE
HGB UR QL STRIP.AUTO: NORMAL
KETONES UR-MCNC: NORMAL
LEUKOCYTE ESTERASE UR QL STRIP: NORMAL
NITRITE UR QL STRIP: NORMAL
PH UR STRIP: 8.5
PROT UR STRIP-MCNC: NORMAL
SP GR UR STRIP: 1.02

## 2021-10-12 PROCEDURE — 81025 URINE PREGNANCY TEST: CPT

## 2021-10-12 PROCEDURE — 99214 OFFICE O/P EST MOD 30 MIN: CPT

## 2021-10-12 PROCEDURE — 81003 URINALYSIS AUTO W/O SCOPE: CPT | Mod: QW

## 2021-10-12 NOTE — HISTORY OF PRESENT ILLNESS
[Currently Active] : currently active [Men] : men [FreeTextEntry1] : Nunu is a 25 y/o G0 who presents today for a repeat pap. She had an ASCUS/+HPV 18/45 pap on 3/10/21 followed by a benign colpo on 3/30/21.  She is also overdue for her 2nd Gardasil vaccine.\par \par Last she states she had pelvic cramping a few days ago and states that she specifically asked to have this appointment with Dr. Robles as she wanted a sonogram to check her IUD.  She states the cramping has since resolved.\par \par In-office pregnancy test is negative today [FreeTextEntry3] : IUD

## 2021-10-12 NOTE — PROCEDURE
[Pelvic Pain] : pelvic pain [Locate IUD] : locate IUD [Transvaginal Ultrasound] : transvaginal ultrasound [FreeTextEntry3] : Performed by Dr. Robles [FreeTextEntry4] : normal pelvic sonogram with IUD noted in situ - as per Dr. Robles

## 2021-10-12 NOTE — DISCUSSION/SUMMARY
[FreeTextEntry1] : 1) pap performed\par 2) in-office pregnancy test negative, signed consent for Gardasil obtained, Gardasil administered\par 3) Pelvic sonogram performed by Dr. Robles - normal, IUD in situ\par \par Return to office in 4 months for last Gardasil, 6 months for annual exam.

## 2021-10-14 ENCOUNTER — NON-APPOINTMENT (OUTPATIENT)
Age: 26
End: 2021-10-14

## 2022-01-26 ENCOUNTER — APPOINTMENT (OUTPATIENT)
Dept: OBGYN | Facility: CLINIC | Age: 27
End: 2022-01-26

## 2022-03-30 ENCOUNTER — APPOINTMENT (OUTPATIENT)
Dept: OBGYN | Facility: CLINIC | Age: 27
End: 2022-03-30
Payer: COMMERCIAL

## 2022-03-30 VITALS
SYSTOLIC BLOOD PRESSURE: 116 MMHG | BODY MASS INDEX: 24.45 KG/M2 | HEIGHT: 63 IN | WEIGHT: 138 LBS | DIASTOLIC BLOOD PRESSURE: 76 MMHG

## 2022-03-30 PROCEDURE — 99395 PREV VISIT EST AGE 18-39: CPT

## 2022-05-04 ENCOUNTER — NON-APPOINTMENT (OUTPATIENT)
Age: 27
End: 2022-05-04

## 2022-10-04 ENCOUNTER — APPOINTMENT (OUTPATIENT)
Dept: OBGYN | Facility: CLINIC | Age: 27
End: 2022-10-04

## 2022-10-04 VITALS
TEMPERATURE: 98 F | DIASTOLIC BLOOD PRESSURE: 72 MMHG | SYSTOLIC BLOOD PRESSURE: 118 MMHG | BODY MASS INDEX: 25.51 KG/M2 | WEIGHT: 144 LBS

## 2022-10-04 DIAGNOSIS — Z30.430 ENCOUNTER FOR INSERTION OF INTRAUTERINE CONTRACEPTIVE DEVICE: ICD-10-CM

## 2022-10-04 PROCEDURE — 76830 TRANSVAGINAL US NON-OB: CPT

## 2022-10-04 PROCEDURE — 81025 URINE PREGNANCY TEST: CPT

## 2022-10-04 PROCEDURE — 58300 INSERT INTRAUTERINE DEVICE: CPT

## 2022-10-04 NOTE — PROCEDURE
[IUD Placement] : intrauterine device (IUD) placement [Prevention of Pregnancy] : prevention of pregnancy [Infection] : infection [Bleeding] : bleeding [Pain] : pain [Expulsion] : expulsion [Failure] : failure [Uterine Perforation] : uterine perforation [Neg Pregnancy Test] : negative pregnancy test [Betadine] : Betadine [Easy Passage] : Easy passage [Post Placement Transvag. US] : post placement transvaginal ultrasound [Kyleena IUD] : Kyleena IUD [IUD Removal] : intrauterine device (IUD) removal [Time out performed] : Pre-procedure time out performed.  Patient's name, date of birth and procedure confirmed. [Consent Obtained] : Consent obtained [ IUD] :  IUD [Risks] : risks [Benefits] : benefits [Alternatives] : alternatives [Patient] : patient [Speculum Placed] : speculum placed [IUD Removed - Forceps] : IUD removed - forceps [IUD Discarded] : IUD discarded [Sent to Pathology] : specimen was placed in buffered formalin and sent for pathology [Tolerated Well] : Patient tolerated the procedure well [No Complications] : no complications [Heavy Vaginal Bleeding] : for heavy vaginal bleeding [Pelvic Pain] : for pelvic pain [de-identified] : Adriana clamp [de-identified] : [id number] [de-identified] : 6/2024 [de-identified] : 10/2027

## 2022-10-05 LAB — HCG UR QL: NEGATIVE

## 2022-12-06 ENCOUNTER — NON-APPOINTMENT (OUTPATIENT)
Age: 27
End: 2022-12-06

## 2023-01-17 ENCOUNTER — APPOINTMENT (OUTPATIENT)
Dept: OBGYN | Facility: CLINIC | Age: 28
End: 2023-01-17

## 2023-10-10 NOTE — CHIEF COMPLAINT
Elsie S Grasol Patient Age: 72 year old  MESSAGE: Interpreting service used: No    Insurance on file confirmed with caller: Yes    IM/FP- Orders- Question about Current Order-     Name of order: sports medicine -     Question about order: Can this order or rushed or is there another place pt can go for sooner than scheduled appt of 11.6.23?-    Provider's home site: [facility]- CONNECT CALL TO CALL CENTER [facility] QUEUE- ROUTE MESSAGE TO CALL CENTER [facility] POOL ([phone])    Message read back to caller for accuracy: Yes       ALLERGIES:  Latex, Adhesive   (environmental), Codeine, and Lisinopril  Current Outpatient Medications   Medication Sig Dispense Refill   • ALPRAZolam (XANAX) 0.25 MG tablet Take 1 tablet by mouth daily as needed for Anxiety. 15 tablet 0   • ketoconazole (NIZORAL) 2 % cream Apply topically daily. 15 g 0   • methylpheniDATE (RITALIN) 5 MG tablet Take 1 tablet by mouth daily. 30 tablet 0   • sertraline (ZOLOFT) 100 MG tablet Take 1 tablet by mouth daily. 30 tablet 1   • OLANZapine (ZyPREXA) 2.5 MG tablet Take 1 tablet by mouth nightly. 30 tablet 1   • cholecalciferol (VITAMIN D) 25 mcg (1,000 units) tablet Take 1 tablet by mouth daily.       No current facility-administered medications for this visit.     PHARMACY to use: see below         Pharmacy preference(s) on file:   [id number] - [place], [place] - [address] [place] [address]  [address] [place] [address]  [place] [address]  Phone: [phone] Fax: [phone]      CALL BACK INFO: Ok to leave response (including medical information) on answering machine      PCP: Ashley Gonzalez MD         INS: Payor: [facility] MEDICARE ADVANTAGE / Plan:  BE /074 / Product Type:  MEDICARE ADVANTAGE   PATIENT ADDRESS:  [address] [place] [address] [address]  [place] [address]     [FreeTextEntry1] : Pelvic pain

## 2023-12-31 PROBLEM — Z20.2 POSSIBLE EXPOSURE TO STD: Status: ACTIVE | Noted: 2017-10-02

## 2024-04-09 ENCOUNTER — APPOINTMENT (OUTPATIENT)
Dept: OBGYN | Facility: CLINIC | Age: 29
End: 2024-04-09
Payer: COMMERCIAL

## 2024-04-09 VITALS
DIASTOLIC BLOOD PRESSURE: 70 MMHG | HEIGHT: 67 IN | BODY MASS INDEX: 22.13 KG/M2 | WEIGHT: 141 LBS | SYSTOLIC BLOOD PRESSURE: 114 MMHG

## 2024-04-09 DIAGNOSIS — Z01.419 ENCOUNTER FOR GYNECOLOGICAL EXAMINATION (GENERAL) (ROUTINE) W/OUT ABNORMAL FINDINGS: ICD-10-CM

## 2024-04-09 DIAGNOSIS — Z97.5 PRESENCE OF (INTRAUTERINE) CONTRACEPTIVE DEVICE: ICD-10-CM

## 2024-04-09 PROCEDURE — 99395 PREV VISIT EST AGE 18-39: CPT

## 2024-04-10 LAB
C TRACH RRNA SPEC QL NAA+PROBE: NOT DETECTED
HPV HIGH+LOW RISK DNA PNL CVX: NOT DETECTED
N GONORRHOEA RRNA SPEC QL NAA+PROBE: NOT DETECTED
SOURCE TP AMPLIFICATION: NORMAL

## 2024-04-11 LAB — CYTOLOGY CVX/VAG DOC THIN PREP: NORMAL

## 2024-05-03 ENCOUNTER — APPOINTMENT (OUTPATIENT)
Dept: ULTRASOUND IMAGING | Facility: CLINIC | Age: 29
End: 2024-05-03

## 2024-05-03 ENCOUNTER — OUTPATIENT (OUTPATIENT)
Dept: OUTPATIENT SERVICES | Facility: HOSPITAL | Age: 29
LOS: 1 days | End: 2024-05-03
Payer: COMMERCIAL

## 2024-05-03 DIAGNOSIS — Z97.5 PRESENCE OF (INTRAUTERINE) CONTRACEPTIVE DEVICE: ICD-10-CM

## 2024-05-03 PROCEDURE — 76830 TRANSVAGINAL US NON-OB: CPT

## 2024-05-06 ENCOUNTER — RESULT REVIEW (OUTPATIENT)
Age: 29
End: 2024-05-06

## 2024-05-06 PROCEDURE — 76830 TRANSVAGINAL US NON-OB: CPT | Mod: 26

## 2024-11-14 ENCOUNTER — APPOINTMENT (OUTPATIENT)
Dept: FAMILY MEDICINE | Facility: CLINIC | Age: 29
End: 2024-11-14
Payer: COMMERCIAL

## 2024-11-14 ENCOUNTER — MED ADMIN CHARGE (OUTPATIENT)
Age: 29
End: 2024-11-14

## 2024-11-14 VITALS
WEIGHT: 136 LBS | HEART RATE: 81 BPM | BODY MASS INDEX: 21.35 KG/M2 | OXYGEN SATURATION: 98 % | DIASTOLIC BLOOD PRESSURE: 70 MMHG | HEIGHT: 67 IN | SYSTOLIC BLOOD PRESSURE: 110 MMHG

## 2024-11-14 DIAGNOSIS — Z13.29 ENCOUNTER FOR SCREENING FOR OTHER SUSPECTED ENDOCRINE DISORDER: ICD-10-CM

## 2024-11-14 DIAGNOSIS — Z00.00 ENCOUNTER FOR GENERAL ADULT MEDICAL EXAMINATION W/OUT ABNORMAL FINDINGS: ICD-10-CM

## 2024-11-14 DIAGNOSIS — L65.9 NONSCARRING HAIR LOSS, UNSPECIFIED: ICD-10-CM

## 2024-11-14 DIAGNOSIS — Z13.220 ENCOUNTER FOR SCREENING FOR LIPOID DISORDERS: ICD-10-CM

## 2024-11-14 DIAGNOSIS — K59.00 CONSTIPATION, UNSPECIFIED: ICD-10-CM

## 2024-11-14 DIAGNOSIS — Z13.1 ENCOUNTER FOR SCREENING FOR DIABETES MELLITUS: ICD-10-CM

## 2024-11-14 PROCEDURE — 99385 PREV VISIT NEW AGE 18-39: CPT

## 2024-11-18 ENCOUNTER — OUTPATIENT (OUTPATIENT)
Dept: OUTPATIENT SERVICES | Facility: HOSPITAL | Age: 29
LOS: 1 days | End: 2024-11-18
Payer: COMMERCIAL

## 2024-11-18 ENCOUNTER — APPOINTMENT (OUTPATIENT)
Dept: ULTRASOUND IMAGING | Facility: CLINIC | Age: 29
End: 2024-11-18
Payer: COMMERCIAL

## 2024-11-18 ENCOUNTER — APPOINTMENT (OUTPATIENT)
Age: 29
End: 2024-11-18

## 2024-11-18 DIAGNOSIS — K59.00 CONSTIPATION, UNSPECIFIED: ICD-10-CM

## 2024-11-18 DIAGNOSIS — R10.2 PELVIC AND PERINEAL PAIN: ICD-10-CM

## 2024-11-18 LAB
ALBUMIN SERPL ELPH-MCNC: 4.8 G/DL
ALP BLD-CCNC: 71 U/L
ALT SERPL-CCNC: 10 U/L
ANION GAP SERPL CALC-SCNC: 17 MMOL/L
APPEARANCE: CLEAR
AST SERPL-CCNC: 23 U/L
BACTERIA: NEGATIVE /HPF
BASOPHILS # BLD AUTO: 0.07 K/UL
BASOPHILS NFR BLD AUTO: 1.3 %
BILIRUB SERPL-MCNC: 0.6 MG/DL
BILIRUBIN URINE: NEGATIVE
BLOOD URINE: NEGATIVE
BUN SERPL-MCNC: 8 MG/DL
CALCIUM SERPL-MCNC: 9.7 MG/DL
CAST: 0 /LPF
CHLORIDE SERPL-SCNC: 103 MMOL/L
CHOLEST SERPL-MCNC: 168 MG/DL
CO2 SERPL-SCNC: 20 MMOL/L
COLOR: YELLOW
CREAT SERPL-MCNC: 0.66 MG/DL
EGFR: 121 ML/MIN/1.73M2
EOSINOPHIL # BLD AUTO: 0.04 K/UL
EOSINOPHIL NFR BLD AUTO: 0.8 %
EPITHELIAL CELLS: 0 /HPF
ESTIMATED AVERAGE GLUCOSE: 88 MG/DL
FERRITIN SERPL-MCNC: 95 NG/ML
FOLATE SERPL-MCNC: 11.1 NG/ML
GLUCOSE QUALITATIVE U: NEGATIVE MG/DL
GLUCOSE SERPL-MCNC: 85 MG/DL
HBA1C MFR BLD HPLC: 4.7 %
HCT VFR BLD CALC: 44.2 %
HDLC SERPL-MCNC: 83 MG/DL
HGB BLD-MCNC: 14.9 G/DL
IMM GRANULOCYTES NFR BLD AUTO: 0.2 %
IRON SATN MFR SERPL: 41 %
IRON SERPL-MCNC: 142 UG/DL
KETONES URINE: 15 MG/DL
LDLC SERPL CALC-MCNC: 77 MG/DL
LEUKOCYTE ESTERASE URINE: NEGATIVE
LYMPHOCYTES # BLD AUTO: 2.43 K/UL
LYMPHOCYTES NFR BLD AUTO: 46.3 %
MAN DIFF?: NORMAL
MCHC RBC-ENTMCNC: 31 PG
MCHC RBC-ENTMCNC: 33.7 G/DL
MCV RBC AUTO: 91.9 FL
MICROSCOPIC-UA: NORMAL
MONOCYTES # BLD AUTO: 0.37 K/UL
MONOCYTES NFR BLD AUTO: 7 %
NEUTROPHILS # BLD AUTO: 2.33 K/UL
NEUTROPHILS NFR BLD AUTO: 44.4 %
NITRITE URINE: NEGATIVE
NONHDLC SERPL-MCNC: 85 MG/DL
PH URINE: 6.5
PLATELET # BLD AUTO: 168 K/UL
POTASSIUM SERPL-SCNC: 4.6 MMOL/L
PROT SERPL-MCNC: 7.9 G/DL
PROTEIN URINE: NEGATIVE MG/DL
RBC # BLD: 4.81 M/UL
RBC # FLD: 12.4 %
RED BLOOD CELLS URINE: 0 /HPF
SODIUM SERPL-SCNC: 140 MMOL/L
SPECIFIC GRAVITY URINE: 1.01
TIBC SERPL-MCNC: 348 UG/DL
TRIGL SERPL-MCNC: 37 MG/DL
TSH SERPL-ACNC: 1.58 UIU/ML
UIBC SERPL-MCNC: 207 UG/DL
URATE SERPL-MCNC: 4.6 MG/DL
UROBILINOGEN URINE: 0.2 MG/DL
VIT B12 SERPL-MCNC: 816 PG/ML
WBC # FLD AUTO: 5.25 K/UL
WHITE BLOOD CELLS URINE: 0 /HPF

## 2024-11-18 PROCEDURE — 76700 US EXAM ABDOM COMPLETE: CPT | Mod: 26

## 2024-11-18 PROCEDURE — 76856 US EXAM PELVIC COMPLETE: CPT

## 2024-11-18 PROCEDURE — 76856 US EXAM PELVIC COMPLETE: CPT | Mod: 26,59

## 2024-11-18 PROCEDURE — 76700 US EXAM ABDOM COMPLETE: CPT

## 2024-11-18 PROCEDURE — 76830 TRANSVAGINAL US NON-OB: CPT | Mod: 26

## 2024-11-18 PROCEDURE — 76830 TRANSVAGINAL US NON-OB: CPT

## 2024-11-19 ENCOUNTER — APPOINTMENT (OUTPATIENT)
Age: 29
End: 2024-11-19

## 2024-12-05 DIAGNOSIS — A09 INFECTIOUS GASTROENTERITIS AND COLITIS, UNSPECIFIED: ICD-10-CM

## 2024-12-05 RX ORDER — AZITHROMYCIN 500 MG/1
500 TABLET, FILM COATED ORAL DAILY
Qty: 1 | Refills: 0 | Status: ACTIVE | COMMUNITY
Start: 2024-12-05 | End: 1900-01-01

## 2024-12-05 RX ORDER — OMEPRAZOLE 20 MG/1
20 CAPSULE, DELAYED RELEASE ORAL
Qty: 30 | Refills: 0 | Status: ACTIVE | COMMUNITY
Start: 2024-12-05 | End: 1900-01-01

## 2024-12-05 RX ORDER — ONDANSETRON 4 MG/1
4 TABLET, ORALLY DISINTEGRATING ORAL 3 TIMES DAILY
Qty: 30 | Refills: 0 | Status: ACTIVE | COMMUNITY
Start: 2024-12-05 | End: 1900-01-01

## 2025-01-13 DIAGNOSIS — H92.09 OTALGIA, UNSPECIFIED EAR: ICD-10-CM

## 2025-01-13 RX ORDER — AZITHROMYCIN 250 MG/1
250 TABLET, FILM COATED ORAL DAILY
Qty: 1 | Refills: 0 | Status: ACTIVE | COMMUNITY
Start: 2025-01-13 | End: 1900-01-01

## 2025-01-13 RX ORDER — FLUTICASONE PROPIONATE 50 UG/1
50 SPRAY, METERED NASAL
Qty: 1 | Refills: 0 | Status: ACTIVE | COMMUNITY
Start: 2025-01-13 | End: 1900-01-01

## 2025-01-13 RX ORDER — METHYLPREDNISOLONE 4 MG/1
4 TABLET ORAL
Qty: 1 | Refills: 0 | Status: ACTIVE | COMMUNITY
Start: 2025-01-13 | End: 1900-01-01

## 2025-04-10 ENCOUNTER — APPOINTMENT (OUTPATIENT)
Dept: OBGYN | Facility: CLINIC | Age: 30
End: 2025-04-10
Payer: COMMERCIAL

## 2025-04-10 VITALS
BODY MASS INDEX: 21.66 KG/M2 | WEIGHT: 138 LBS | HEIGHT: 67 IN | DIASTOLIC BLOOD PRESSURE: 68 MMHG | SYSTOLIC BLOOD PRESSURE: 116 MMHG

## 2025-04-10 DIAGNOSIS — Z01.419 ENCOUNTER FOR GYNECOLOGICAL EXAMINATION (GENERAL) (ROUTINE) W/OUT ABNORMAL FINDINGS: ICD-10-CM

## 2025-04-10 PROCEDURE — 99395 PREV VISIT EST AGE 18-39: CPT

## 2025-06-20 ENCOUNTER — OUTPATIENT (OUTPATIENT)
Dept: OUTPATIENT SERVICES | Facility: HOSPITAL | Age: 30
LOS: 1 days | End: 2025-06-20
Payer: COMMERCIAL

## 2025-06-20 ENCOUNTER — APPOINTMENT (OUTPATIENT)
Dept: ULTRASOUND IMAGING | Facility: CLINIC | Age: 30
End: 2025-06-20

## 2025-06-20 ENCOUNTER — NON-APPOINTMENT (OUTPATIENT)
Age: 30
End: 2025-06-20

## 2025-06-20 DIAGNOSIS — N83.209 UNSPECIFIED OVARIAN CYST, UNSPECIFIED SIDE: ICD-10-CM

## 2025-06-20 PROCEDURE — 76830 TRANSVAGINAL US NON-OB: CPT | Mod: 26

## 2025-06-20 PROCEDURE — 76830 TRANSVAGINAL US NON-OB: CPT
